# Patient Record
Sex: MALE | Race: WHITE
[De-identification: names, ages, dates, MRNs, and addresses within clinical notes are randomized per-mention and may not be internally consistent; named-entity substitution may affect disease eponyms.]

---

## 2018-10-04 ENCOUNTER — HOSPITAL ENCOUNTER (OUTPATIENT)
Dept: HOSPITAL 96 - M.SUR | Age: 48
Discharge: HOME | End: 2018-10-04
Attending: ORTHOPAEDIC SURGERY
Payer: COMMERCIAL

## 2018-10-04 DIAGNOSIS — Z79.82: ICD-10-CM
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
## 2018-10-04 DIAGNOSIS — Z79.899: ICD-10-CM

## 2018-10-04 DIAGNOSIS — M65.871: ICD-10-CM

## 2018-10-04 DIAGNOSIS — S93.491A: Primary | ICD-10-CM

## 2018-10-04 DIAGNOSIS — M25.371: ICD-10-CM

## 2018-10-04 LAB
ANION GAP SERPL CALC-SCNC: 7 MMOL/L (ref 7–16)
BUN SERPL-MCNC: 13 MG/DL (ref 7–18)
CALCIUM SERPL-MCNC: 8.7 MG/DL (ref 8.5–10.1)
CHLORIDE SERPL-SCNC: 103 MMOL/L (ref 98–107)
CO2 SERPL-SCNC: 30 MMOL/L (ref 21–32)
CREAT SERPL-MCNC: 0.9 MG/DL (ref 0.6–1.3)
GLUCOSE SERPL-MCNC: 95 MG/DL (ref 70–99)
POTASSIUM SERPL-SCNC: 3.4 MMOL/L (ref 3.5–5.1)
SODIUM SERPL-SCNC: 140 MMOL/L (ref 136–145)

## 2018-10-04 NOTE — EKG
[place] [address]
Phone:  [phone]                     ELECTROCARDIOGRAM REPORT      
_______________________________________________________________________________
 
Name:       ALHAJI DE PAZ              Room:                      [facility]#:  [id number]      Account #:      [id number]  
Admission:  10/04/18     Attend Phys:    Balaji Romero, 
Discharge:               Date of Birth:  [birth year]  
         Report #: [id number]
    [id number]
_______________________________________________________________________________
THIS REPORT FOR:  //name//                      
 
                          [facility]
                                       
Test Date:    2018-10-04               Test Time:    07:54:44
Pat Name:     ALHAJI DE PAZ          Department:   
Patient ID:   [id number]            Room:          
Gender:                               Technician:   
:          1970               Requested By: Balaji Romero
Order Number: [id number]    Joanna MD:   Ruperto Land
                                 Measurements
Intervals                              Axis          
Rate:         77                       P:            61
OK:           143                      QRS:          45
QRSD:         96                       T:            -8
QT:           399                                    
QTc:          452                                    
                           Interpretive Statements
Sinus rhythm
Borderline T abnormalities, inferior leads
No previous ECG available for comparison
 
Electronically Signed On 10-4-2018 16:45:48 CDT by Ruperto Land
[email or web address]
 
 
 
 
 
 
 
 
 
 
 
 
 
 
 
 
 
 
 
  <ELECTRONICALLY SIGNED>
                                           By: Ruperto Land MD, [facility]     
  10/04/18     1645
D: 10/04/18 0754   _____________________________________
T: 10/04/18 0754   Ruperto Land MD, FACC       /EPI

## 2018-10-11 NOTE — OP
[address]  [address]                    OPERATIVE REPORT              
_______________________________________________________________________________
 
Name:       ZANDRAALHAJI MATUTE              Room:                      [facility]#:  [id number]      Account #:      [id number]  
Admission:  10/04/18     Attend Phys:    Balaji Romero, 
Discharge:               Date of Birth:  [birth year]  
         Report #: [id number]
                                                                     [id number]  
_______________________________________________________________________________
THIS REPORT FOR:  //name//                      
 
CC: Manuel Romero
 
DICTATED BY: Mauro Pandey DO
 
DATE OF SERVICE:  10/04/2018
 
 
PREOPERATIVE DIAGNOSIS:  Right ankle chronic lateral ligament insufficiency with
chronic ankle instability.
 
POSTOPERATIVE DIAGNOSIS:  Right ankle chronic lateral ligament insufficiency
with chronic ankle instability.
 
PROCEDURES:
1.  Right ankle arthroscopy with limited synovectomy and debridement.
2.  Right ankle open lateral ligament reconstruction using allograft and the
Arthrex lateral ligament reconstruction kit.
 
SURGEON:  Balaji Romero DO
 
ASSISTANT:  Mauro Pandey DO
 
ANESTHESIA:  General.
 
ESTIMATED BLOOD LOSS:  Minimal, less than 5 mL.
 
TOURNIQUET TIME:  83 minutes at 300 mmHg to right lower extremity.
 
ANTIBIOTICS:  2 g Ancef IV preoperatively.
 
DRAINS:  None.
 
SPECIMENS:  None.
 
DISPOSITION:  Stable to PACU and will be discharged home from PACU.
 
INDICATIONS FOR SURGICAL PROCEDURE:  The patient is a pleasant 48-year-old male
who was seen in orthopedic clinic with chronic complaints of right ankle pain
and feelings of instability.  MR arthrogram displayed lateral ligament
attenuation and exam was consistent with lateral ligament insufficiency with
increased inversion, stressing and laxity with anterior drawer testing.  The
patient wished for operative treatment as his pain was refractory to
conservative measures.  Risks, benefits, complications, indications, alternative
 
 
 
[place] [address]                    OPERATIVE REPORT              
_______________________________________________________________________________
 
Name:       ALHAJI DE PAZ              Room:                      [facility]#:  [id number]      Account #:      [id number]  
Admission:  10/04/18     Attend Phys:    Balaji Romero, 
Discharge:               Date of Birth:  [birth year]  
         Report #: [id number]
                                                                     [id number]  
_______________________________________________________________________________
treatments were discussed and the patient wished to proceed with surgery today.
 
DESCRIPTION OF PROCEDURE:  The patient was seen in preoperative holding area,
correct operative site, the right ankle was initialed.  The patient was taken
back to operating suite, placed in supine position on the operating table, given
benefit of general anesthetic.  A well-padded tourniquet was placed to the right
upper thigh.  Right lower extremity was then prepped and draped in typical
fashion.
 
Surgery began with a timeout, identifying correct patient, correct procedure,
correct operative site, preoperative antibiotics and correct performing surgeon.
 Next, a standard anterior medial portal site for right ankle arthroscopy was
established first starting with injection and insufflation of the joint
utilizing an 18 gauge needle.  Approximately 10 mL of normal saline just medial
to the anterior tibialis tendon, injected directly into the joint, insufflated
appropriately and confirmed we are in the joint.  Skin was incised with a 15
blade scalpel in a vertical fashion, 0.5 cm in length.  Hemostat was entered
into the joint bluntly followed by arthroscopic 2.8 mm camera.  Thorough ankle
arthroscopy was performed.  Articular cartilage appeared rather unremarkable
with no OCD lesion and no significant degenerative changes noted.  There was
significant fibrous tissue and some inflamed synovial tissue over the
anterolateral aspect of the ankle, which we felt needed debrided and likely was
a sign of his lateral ankle ligament insufficiency.  The anterior talofibular
ligament was visualized and felt to be rather lax in nature arthroscopically. 
Next, anterior and lateral portal was established under direct visualization
using spinal needle technique.  A 3.0 mm shaver was introduced through an
anterolateral portal and was used to provide significant debridement of the
surrounding synovium and fibrous scar type tissue.  Next, arthroscopic fluid was
exsanguinated.  Arthroscopic tools were removed.  We turned our attention to the
open part of the procedure.  Next, a curvilinear incision was made roughly 5-6
cm in length starting over the posterolateral aspect of the distal fibula
extending down anteriorly in line with roughly the peroneal tendons. 
Subcutaneous tissues were sharply dissected down to the level of the lateral
ankle capsule.  Inferior peroneal retinaculum was released.  Superior peroneal
retinaculum was preserved throughout the case.  The peroneal tendons were
visualized and protected throughout the case.  Next, anterior and distal
capsulotomy was performed of the lateral ankle.  ATFL and calcaneofibular
ligament remnants were visualized and located.  Both are origin insertions on
the fibula and talus and fibula and calcaneus respectively.  Next, standard
drill holes were performed into the neck of the talus and into the body of the
calcaneus in a normal fashion directly over the insertion of the CFL and ATFL
ligaments respectively as well as subperiosteal sleeve was developed on the
distal fibula and bone tunnels were drilled at the origins of both the CFL and
ATFL and these were connected intraosseously through the distal fibula in the
normal fashion according to the Arthrex lateral ligament reconstruction
technique.  Next, our presuture graft was inserted in the normal fashion using a
 
 
 
[place] [address]                    OPERATIVE REPORT              
_______________________________________________________________________________
 
Name:       ALHAJI DE PAZ              Room:                      [facility]#:  [id number]      Account #:      [id number]  
Admission:  10/04/18     Attend Phys:    Balaji Romero, 
Discharge:               Date of Birth:  [birth year]  
         Report #: [id number]
                                                                     [id number]  
_______________________________________________________________________________
5.5 mm tenodesis screw into the talus and was secured appropriately in normal
fashion.  It was passed through our fibular tunnels in the normal fashion and
was tensioned appropriately while the ankle was held in an eversion type stress
and a 4.75 mm tenodesis screw was inserted into the anterior aspect of the
fibula at the origin of the ATFL.  Next, our calcaneus was drilled in a normal
fashion and graft was measured to the appropriate length to provide appropriate
attention for reconstruction of her CFL part of the ligament.  It was
whipstitched in a normal fashion and was then inserted with a 6.25 tenodesis
screw into the anatomic insertion of the CFL ligament in a normal fashion, had
great bite with all of our tenodesis screws.  Ankle was stressed at the end of
exam, had great stability with both inversion and anterior drawer testing. 
Completed the reconstruction of our ATFL and CFL ligaments.  Capsule was
imbricated in a normal fashion using 2-0.
 
 
 
 
 
 
 
 
 
 
 
 
 
 
 
 
 
 
 
 
 
 
 
 
 
 
 
 
 
 
 
<ELECTRONICALLY SIGNED>
                                        By:  Balaji Romero DO       
10/11/18     1115
D: 10/04/18 1800_______________________________________
T: 10/05/18 0632Balaji Romero DO          /nt

## 2018-10-11 NOTE — OP
[address]  [address]                    OPERATIVE REPORT              
_______________________________________________________________________________
 
Name:       ZANDRAALHAJI GIOVANI              Room:                      [facility]#:  [id number]      Account #:      [id number]  
Admission:  10/04/18     Attend Phys:    Balaji Romero, 
Discharge:               Date of Birth:  [birth year]  
         Report #: [id number]
                                                                     [id number]  
_______________________________________________________________________________
THIS REPORT FOR:  //name//                      
 
CC: Manuel Romero
 
DICTATED BY: Mauro Pandey DO
 
DATE OF SERVICE:  10/04/2018
 
 
PREOPERATIVE DIAGNOSIS:  Right ankle chronic lateral ligament insufficiency with
chronic ankle instability.
 
POSTOPERATIVE DIAGNOSIS:  Right ankle chronic lateral ligament insufficiency
with chronic ankle instability.
 
PROCEDURES:
1.  Right ankle arthroscopy with limited synovectomy and debridement.
2.  Right ankle open lateral ligament reconstruction using allograft and the
Arthrex ankle lateral ligament reconstruction kit.
 
SURGEON:  Balaji Romero DO
 
ASSISTANT:  Mauro Pandey DO
 
ANESTHESIA:  General.
 
ANTIBIOTICS:  2 grams Ancef IV preoperatively.
 
ESTIMATED BLOOD LOSS:  Minimal, less than 5 mL.
 
TOURNIQUET TIME:  83 minutes at 300 mmHg to right lower extremity.
 
DRAINS:  None.
 
SPECIMENS:  None.
 
DISPOSITION:  Stable to PACU and will be discharged to home from PACU.
 
INDICATIONS FOR PROCEDURE:  The patient is a pleasant 48-year-old male who was
seen multiple times in Orthopedic Clinic with complaints of right ankle pain and
feelings of instability with multiple inversion type injuries throughout his
lifetime, worsened within the last year or so with multiple ankle sprains.  An
MR arthrogram was obtained, displayed some attenuation of the lateral ligaments
of the right ankle and on exam, he had significant laxity with inversion type
stressing and with anterior drawer maneuver of his right ankle.  He did not have
 
 
 
[address]  [address]                    OPERATIVE REPORT              
_______________________________________________________________________________
 
Name:       ALHAJI DE PAZ              Room:                      [facility]#:  [id number]      Account #:      [id number]  
Admission:  10/04/18     Attend Phys:    Balaji Romero, 
Discharge:               Date of Birth:  [birth year]  
         Report #: [id number]
                                                                     [id number]  
_______________________________________________________________________________
any significant chondral defects noted on the MRI.  The pain and feelings of
instability were refractory to conservative measures; therefore, recommended
operative treatment consisting of a right ankle arthroscopy with open lateral
ligament reconstruction using allograft.  Risks, benefits, complications and
indications, alternative treatments were discussed and include but not limited
to infection, neurovascular injury, need for further surgery, continued
instability and pain.  The patient wished to proceed with surgery today.
 
DESCRIPTION OF PROCEDURE:  The patient was seen in preoperative holding area;
correct operative site, right ankle was initialed.  The patient was taken back
to the operating suite, placed in supine position on the operating table, given
benefit of general anesthetic.  A well-padded tourniquet was placed to the right
upper thigh and right lower extremity was then prepped and draped in typical
fashion.
 
Surgery began with a timeout, identifying correct patient, correct procedure,
correct operative site, preoperative antibiotics and correct performing surgeon.
 Next, a standard ankle arthroscopy was performed.  We started with our anterior
medial portal, positioned just medial to the anterior tibialis tendon.  The
joint was insufflated with roughly 10 mL of normal saline fluid, was injected
through the anterior medial portal site, directly into the joint, utilizing an
18-gauge needle and confirmed that we were in the appropriate location.  Next,
skin was incised with a 15 blade scalpel, roughly 1 cm vertical incision and
subcutaneous tissues were dissected and the joint was entered with a hemostat. 
Camera trocar was then inserted, followed by the arthroscopic camera, 2.8 mm
camera.  A thorough ankle arthroscopy was performed.  Articular cartilage
surfaces appeared rather unremarkable with minimal degenerative changes and no
osteochondral lesion noted.  There was a significant thickened tissue over the
anterolateral aspect of the ankle as well as some mild synovitis type appearing
tissue.  Our anterolateral portal was established under direct visualization
using spinal needle technique, lateral to the superficial peroneal nerve in the
normal fashion.  Skin was incised with a 15 blade scalpel, roughly 0.5 cm
incision.  Blunt trocar was introduced, followed by a 3.0 mm arthroscopic
shaver, which was used to debride the surrounding synovium and fibrous tissue on
the anterior and lateral aspect of the ankle.  The fibula was visualized and the
anterior talofibular ligament was visualized at this time.  It seemed to have
significant laxity on arthroscopic visualization.  We were able to stress the
ankle and get significant gapping laterally without significant stress,
consistent with his chronic instability and lateral ligament insufficiency. 
After thorough debridement was performed with the arthroscopic shaver,
arthroscopic fluid was exsanguinated from the joint.  All arthroscopic tools
were removed.  We then turned to the open part of the procedure.  An incision
was made directly over the posterior aspect of the distal fibula and curving
anteriorly just distal to the tip of the fibula, roughly in line with the
peroneal tendons.  Incision was made roughly 5-6 cm in length.  Skin was incised
down to subcutaneous tissue, was sharply incised down [place] [address]                    OPERATIVE REPORT              
_______________________________________________________________________________
 
Name:       ALHAJI DE PAZ              Room:                      [facility]#:  [id number]      Account #:      [id number]  
Admission:  10/04/18     Attend Phys:    Balaji Romero, 
Discharge:               Date of Birth:  [birth year]  
         Report #: [id number]
                                                                     [id number]  
_______________________________________________________________________________
tendons were exposed and released from the inferior peroneal retinaculum. 
Superior peroneal retinaculum was preserved throughout the case.  Capsule of the
lateral aspect of the ankle was then incised around the anterior and distal
aspect of the fibula in the normal fashion, leaving normal amount of cuff tissue
to do a modified Brostrom repair at the end.  Anterior talofibular ligament and
remnants of the calcaneofibular ligament were evident at this point.  Dissection
was taken down on to the calcaneus.
 
The wound was thoroughly irrigated.  Subcutaneous tissues were then closed in a
simple inverted fashion with 2-0 Vicryl sutures.  Skin was closed with a running
3-0 subcuticular Stratafix suture, followed by Dermabond on the skin.  Portal
incisions were closed in a simple interrupted fashion with 3-0 nylon suture. 
The patient was placed in a 5-inch OCL short leg posterior splint with
significant padding, consisting of 4 x 4s and multiple rolls of soft roll as
well as two 6-inch compressive Ace wrap bandages over top of the OCL.  The
patient was weaned from his general anesthetic, transferred in stable condition
to the PACU.  All sponge and needle counts were correct x 2.  He should be
nonweightbearing on his right lower extremity and maintain splint/dressing
clean, dry and intact until followup in 2 weeks in Orthopedic Clinic.
 
 
 
 
 
 
 
 
 
 
 
 
 
 
 
 
 
 
 
 
 
 
 
 
 
<ELECTRONICALLY SIGNED>
                                        By:  Balaji Romero DO       
10/11/18     1115
D: 10/04/18 1748_______________________________________
T: 10/05/18 0558[facility]          /nt